# Patient Record
Sex: MALE | Race: WHITE | NOT HISPANIC OR LATINO | URBAN - METROPOLITAN AREA
[De-identification: names, ages, dates, MRNs, and addresses within clinical notes are randomized per-mention and may not be internally consistent; named-entity substitution may affect disease eponyms.]

---

## 2024-11-02 ENCOUNTER — ON-DEMAND VIRTUAL (OUTPATIENT)
Dept: URGENT CARE | Facility: CLINIC | Age: 24
End: 2024-11-02

## 2024-11-02 DIAGNOSIS — J06.9 UPPER RESPIRATORY TRACT INFECTION, UNSPECIFIED TYPE: Primary | ICD-10-CM

## 2024-11-02 DIAGNOSIS — Z02.89 ENCOUNTER TO OBTAIN EXCUSE FROM WORK: ICD-10-CM

## 2024-11-02 PROCEDURE — 99202 OFFICE O/P NEW SF 15 MIN: CPT | Mod: 95,,, | Performed by: NURSE PRACTITIONER

## 2024-12-23 ENCOUNTER — ON-DEMAND VIRTUAL (OUTPATIENT)
Dept: URGENT CARE | Facility: CLINIC | Age: 24
End: 2024-12-23

## 2024-12-23 DIAGNOSIS — J06.9 UPPER RESPIRATORY TRACT INFECTION, UNSPECIFIED TYPE: Primary | ICD-10-CM

## 2024-12-23 NOTE — PROGRESS NOTES
Subjective:      Patient ID: Cristóbal Nguyen is a 24 y.o. male.    Vitals:  vitals were not taken for this visit.     Chief Complaint: Sore Throat (Sore throat and cough started 2 days ago. )      Visit Type: TELE AUDIOVISUAL    Present with the patient at the time of consultation: TELEMED PRESENT WITH PATIENT: None    No past medical history on file.  No past surgical history on file.  Review of patient's allergies indicates:  No Known Allergies  No current outpatient medications on file prior to visit.     No current facility-administered medications on file prior to visit.     No family history on file.        [facility] Intake    12/23/2024  3:22 PM CST - Filed by Patient   What is your current physical address in the event of a medical emergency? [address] [place] [address]. [address] [id number], [place], [address]   Are you able to take your vital signs? No   Please attach any relevant images or files    Is your employer contracted with [facility]? No         HPI     Sore Throat     Additional comments: Sore throat and cough started 2 days ago. No fevers.  Needs work note.  Two patient identifiers were used-name was repeated verbally as well as date of birth.  The patient is located in his home in [place]          HENT:  Positive for congestion and sore throat.    Respiratory:  Positive for cough.         Objective:   The physical exam was conducted virtually.  Physical Exam   Constitutional: He is oriented to person, place, and time. No distress.   HENT:   Head: Normocephalic and atraumatic.   Neck: Neck supple.   Pulmonary/Chest: Effort normal. No respiratory distress.   Abdominal: Normal appearance.   Musculoskeletal: Normal range of motion.         General: Normal range of motion.   Neurological: no focal deficit. He is alert and oriented to person, place, and time.   Skin: Skin is not pale.   Psychiatric: His behavior is normal. Mood, judgment and thought content normal.       Assessment:     1. Upper respiratory  tract infection, unspecified type        Plan:       Upper respiratory tract infection, unspecified type      Push fluids; tylenol or ibuprofen as needed for fever or discomfort.  F/u with PCP; to ER for worsening of symptoms.    You must understand that you've received a virtual Care treatment only and that you may be released before all your medical problems are known or treated. You, the patient, will arrange for follow up care as instructed.  If your condition worsens we recommend that you receive another evaluation at an urgent care in person, the emergency room or contact your primary medical clinics after hours call service to discuss your concerns.

## 2024-12-23 NOTE — LETTER
December 23, 2024    Cristóbal Nguyen  [address] [facility] [address]             Virtual Visit - Urgent Care  Urgent Care  [address] [facility] [address]   December 23, 2024     Patient: Cristóbal Nguyen   YOB: 2000   Date of Visit: 12/23/2024       To Whom it May Concern:    Cristóbal Nguyen was seen virtually on 12/23/2024. He may return to work on 12/29/24 .    Please excuse him from any classes or work missed.    If you have any questions or concerns, please don't hesitate to call.    Sincerely,         Mae Hodge, JOHNATHANP

## 2024-12-23 NOTE — LETTER
December 23, 2024    Cristóbal Nguyen  [address] [facility] [id number]             Virtual Visit - Urgent Care  Urgent Care  [address] [facility] [address]   December 23, 2024     Patient: Cristóbal Nguyen   YOB: 2000   Date of Visit: 12/23/2024       To Whom it May Concern:    Cristóbal Nguyen was seen virtually on 12/23/2024. He may return to work on 12/29/24 .  He was diagnosed with upper respiratory infection.    Please excuse him from any classes or work missed.    If you have any questions or concerns, please don't hesitate to call.    Sincerely,         Mae Hodge, JOHNATHANP